# Patient Record
Sex: MALE | URBAN - METROPOLITAN AREA
[De-identification: names, ages, dates, MRNs, and addresses within clinical notes are randomized per-mention and may not be internally consistent; named-entity substitution may affect disease eponyms.]

---

## 2022-11-22 ENCOUNTER — OFFICE VISIT (OUTPATIENT)
Dept: URBAN - METROPOLITAN AREA CLINIC 30 | Facility: CLINIC | Age: 67
End: 2022-11-22
Payer: MEDICARE

## 2022-11-22 DIAGNOSIS — H43.813 VITREOUS DEGENERATION, BILATERAL: Primary | ICD-10-CM

## 2022-11-22 DIAGNOSIS — J30.2 OTHER SEASONAL ALLERGIC RHINITIS: ICD-10-CM

## 2022-11-22 PROCEDURE — 92134 CPTRZ OPH DX IMG PST SGM RTA: CPT

## 2022-11-22 PROCEDURE — 92004 COMPRE OPH EXAM NEW PT 1/>: CPT

## 2022-11-22 ASSESSMENT — INTRAOCULAR PRESSURE
OD: 14
OS: 16

## 2022-11-22 ASSESSMENT — VISUAL ACUITY
OD: 20/20
OS: 20/20

## 2022-11-22 ASSESSMENT — KERATOMETRY
OD: 43.13
OS: 43.38

## 2022-11-22 NOTE — IMPRESSION/PLAN
Impression: Other seasonal allergic rhinitis: J30.2. Plan: Pt c/o superior eyelid swelling upon awakening x past few weeks. Recommended to take Benadryl per PCP. Denies known allergies to environment. Rec anti-histamine (Zyrtec vs. Claritin) and cool compresses. Wash bedsheets and try to identify causative factors.